# Patient Record
Sex: MALE | Race: WHITE | Employment: PART TIME | URBAN - METROPOLITAN AREA
[De-identification: names, ages, dates, MRNs, and addresses within clinical notes are randomized per-mention and may not be internally consistent; named-entity substitution may affect disease eponyms.]

---

## 2022-03-16 ENCOUNTER — APPOINTMENT (OUTPATIENT)
Dept: PHYSICAL THERAPY | Age: 27
End: 2022-03-16
Payer: MEDICAID

## 2022-03-21 ENCOUNTER — HOSPITAL ENCOUNTER (OUTPATIENT)
Dept: PHYSICAL THERAPY | Age: 27
Discharge: HOME | End: 2022-03-21
Payer: MEDICAID

## 2022-03-21 PROCEDURE — 97162 PT EVAL MOD COMPLEX 30 MIN: CPT | Performed by: PHYSICAL THERAPIST

## 2022-03-21 NOTE — PROGRESS NOTES
[facility]  [facility]  [address], [address], [address]  Phone: [phone]  Fax: [phone] / [address] [facility]  Patient Name: Terra Anne : 1995   Medical   Diagnosis: Pain in right ankle [M25.571] Treatment Diagnosis: R Ankle Pain   Onset Date: 8 years ago     Referral Source: Tasia Fitzgerald DPM Start of Care [facility] operated by [facility]): 3/21/2022   Prior Hospitalization: See medical history Provider #: [id number]   Prior Level of Function: Several h/o R ankle instability episodes due to prior injury    Comorbidities: HTN   Medications: Verified on Patient Summary List   The Plan of Care and following information is based on the information from the initial evaluation.   ===========================================================================================  Assessment / key information:  22 y/o male presents to PT with long h/o R ankle pain and instability episodes. Symptoms started when he had a snowboarding accident about 8 years ago with a \"growth plate injury. \"  He notes that pain and instability episodes have become more frequent over the years. His pain is rated at 5/10, and notes the ankle clunks often - and has resulted in him falling on multiple occassions. He received a \"[employer]race\" brace back in November and this has helped with instability episdoes. Examination reveals pt walks with R LE ER at hip. He has flatten arches bilaterally. R ankle is limited into DF by tightness of the gastroc and strength testing reveals weakness of R ankle tibialis posterior (3+/5). Inversion ROM is increased bilaterally. Single leg stance on the right is more difficult to control with a tendency to increase WB onto medial arch. Mr Carine Walker has signs and symptoms suggestive of chronic R ankle pain due to underlying functional instability. ===========================================================================================  Eval Complexity: History MEDIUM  Complexity : 1-2 comorbidities / personal factors will impact the outcome/ POC ;  Examination  MEDIUM Complexity : 3 Standardized tests and measures addressing body structure, function, activity limitation and / or participation in recreation ; Presentation MEDIUM Complexity : Evolving with changing characteristics ; Decision Making MEDIUM Complexity : FOTO score of 26-74; Overall Complexity MEDIUM  Problem List: pain affecting function, decrease ROM, decrease strength, impaired gait/ balance, decrease ADL/ functional abilitiies, decrease activity tolerance, decrease flexibility/ joint mobility and decrease transfer abilities   Treatment Plan may include any combination of the following: Therapeutic exercise, Therapeutic activities, Neuromuscular re-education, Physical agent/modality, Gait/balance training, Manual therapy, Dry Needling, Aquatic therapy, Patient education, Self Care training, Functional mobility training, Home safety training and Stair training  Patient / Family readiness to learn indicated by: asking questions, trying to perform skills and interest  Persons(s) to be included in education: patient (P)  Barriers to Learning/Limitations: None  Measures taken:    Patient Goal (s): \"less pain\"   Patient self reported health status: good  Rehabilitation Potential: good   Short Term Goals: To be accomplished in  2  weeks:  1. Pt independent with basic HEP for basic calf stretching and ankle strengthening. 2. Pt to manage symptoms to </= 2/10 with HEP, RICE principle, and activity modification.  Long Term Goals: To be accomplished in  6  weeks:  1. Pt to increase FOTO score by 20 point sot reveal fucntional improvements. 2. Pt independent with high level HEP for R ankle flexibility, strengthening/endurance and proprioception.   3. Pt able to walk with equal gait components bilaterally. 4. Pt able to work entire shift without pain>/= 2/10. Frequency / Duration:   Patient to be seen  2  times per week for 6  weeks:  Patient / Caregiver education and instruction: self care, activity modification, brace/ splint application and exercises    Therapist Signature: Terell Meadows PT Date: 2/48/8077   Certification Period: NA Time: 8:44 AM   ===========================================================================================  I certify that the above Physical Therapy Services are being furnished while the patient is under my care. I agree with the treatment plan and certify that this therapy is necessary. Physician Signature:        Date:       Time:  _     John Reyes DPM  Please sign and return to [facility] or you may fax the signed copy to [phone]. Thank you.

## 2022-03-21 NOTE — PROGRESS NOTES
PHYSICAL THERAPY - DAILY TREATMENT NOTE    Patient Name: Terra Anne        Date: 3/21/2022  : 1995   YES Patient  Verified  Visit #:     Insurance: Payor: Rama More / Plan: 1 [place] [id number] / Product Type: Managed Care Medicaid /      In time: 3:00 Out time: 3:45   Total Treatment Time: 45     BCBS/Medicare Time Tracking (below)   Total Timed Codes (min):  - 1:1 Treatment Time:  -     TREATMENT AREA =  Pain in right ankle [M25.571]    SUBJECTIVE  Pain Level (on 0 to 10 scale):  5  / 10   Medication Changes/New allergies or changes in medical history, any new surgeries or procedures?     NO    If yes, update Summary List   Subjective Functional Status/Changes:  [x]  No changes reported     See eval/POC           Modalities Rationale:     decrease edema, decrease inflammation and decrease pain to improve patient's ability to prevent soreness   min [] Estim, type/location:                                      []  att     []  unatt     []  w/US     []  w/ice    []  w/heat    min []  Mechanical Traction: type/lbs                   []  pro   []  sup   []  int   []  cont    []  before manual    []  after manual    min []  Ultrasound, settings/location:      min []  Iontophoresis w/ dexamethasone, location:                                               []  take home patch       []  in clinic   - min []  Ice     []  Heat    location/position:     min []  Vasopneumatic Device, press/temp:     min []  Other:    [x] Skin assessment post-treatment (if applicable):    [x]  intact    []  redness- no adverse reaction     []redness  adverse reaction:        5 min Therapeutic Exercise:  [x]  See flow sheet   Rationale:      increase ROM, increase strength, improve coordination and increase proprioception to improve the patients ability to increase activity tolerance         Billed With/As:   [] TE   [] TA   [] Neuro   [] Self Care Patient Education: [x] Review HEP [] Progressed/Changed HEP based on:   [] positioning   [] body mechanics   [] transfers   [] heat/ice application    [] other:      Other Objective/Functional Measures:    See Eval     Post Treatment Pain Level (on 0 to 10) scale:   5  / 10     ASSESSMENT  Assessment/Changes in Function:     Pt has signs and symptoms suggestive of R ankle OA with underlying instability episodes. []  See Progress Note/Recertification   Patient will continue to benefit from skilled PT services to modify and progress therapeutic interventions, address functional mobility deficits, address ROM deficits, address strength deficits, analyze and address soft tissue restrictions, analyze and cue movement patterns, analyze and modify body mechanics/ergonomics, and assess and modify postural abnormalities to attain remaining goals.    Progress toward goals / Updated goals:    Goals established today     PLAN  [x]  Upgrade activities as tolerated YES Continue plan of care   []  Discharge due to :    []  Other:      Therapist: Paulina Zepeda PT    Date: 3/21/2022 Time: 8:45 AM     Future Appointments   Date Time Provider Prashant Tejeda   3/21/2022  3:00 PM Fredy Sherwood PT [facility]

## 2022-03-22 ENCOUNTER — HOSPITAL ENCOUNTER (OUTPATIENT)
Dept: PHYSICAL THERAPY | Age: 27
Discharge: HOME | End: 2022-03-22
Payer: MEDICAID

## 2022-03-22 PROCEDURE — 97116 GAIT TRAINING THERAPY: CPT

## 2022-03-22 PROCEDURE — 97112 NEUROMUSCULAR REEDUCATION: CPT

## 2022-03-22 PROCEDURE — 97110 THERAPEUTIC EXERCISES: CPT

## 2022-03-22 NOTE — PROGRESS NOTES
PHYSICAL THERAPY - DAILY TREATMENT NOTE    Patient Name: Chito Blake        Date: 3/22/2022  : 1995   YES Patient  Verified  Visit #:   2   of   12  Insurance: Payor: Griseldaandrea Murphy / Plan: 1 [facility] / Product Type: Managed Care Medicaid /      In time: 6999 Out time: 140   Total Treatment Time: 55     BCBS/Medicare Time Tracking (below)   Total Timed Codes (min):  NA 1:1 Treatment Time:  NA     TREATMENT AREA =  Pain in right ankle [M25.571]    SUBJECTIVE  Pain Level (on 0 to 10 scale):  4-5  / 10   Medication Changes/New allergies or changes in medical history, any new surgeries or procedures? NO    If yes, update Summary List   Subjective Functional Status/Changes:  []  No changes reported     I am always in pain if I am on my feet. Modalities Rationale:     decrease edema, decrease inflammation and decrease pain to improve patient's ability to perform pain-free ADLs.     min [] Estim, type/location:                                      []  att     []  unatt     []  w/US     []  w/ice    []  w/heat    min []  Mechanical Traction: type/lbs                   []  pro   []  sup   []  int   []  cont    []  before manual    []  after manual    min []  Ultrasound, settings/location:      min []  Iontophoresis w/ dexamethasone, location:                                               []  take home patch       []  in clinic   10 min [x]  Ice     []  Heat    location/position: R ankle in longsitting    min []  Vasopneumatic Device, press/temp:    If using vaso (only need to measure limb vaso being performed on)      pre-treatment girth :       post-treatment girth :       measured at (landmark location) :      min []  Other:    [x] Skin assessment post-treatment (if applicable):    [x]  intact    [x]  redness- no adverse reaction                  []redness  adverse reaction:        15 min Therapeutic Exercise:  [x]  See flow sheet   Rationale: increase ROM, increase strength, improve coordination and increase proprioception to improve the patients ability to perform unlimited ADLs. 10 min Therapeutic Activity: [x]  See flow sheet   Rationale:    increase ROM, increase strength and improve coordination to improve the patients ability to improve gait abilities    20 min Neuromuscular Re-ed: [x]  See flow sheet   Rationale:    increase strength, improve coordination, improve balance and increase proprioception to improve the patients ability to improve stability with ambulation    Billed With/As:   [x] TE   [] TA   [] Neuro   [] Self Care Patient Education: [x] Review HEP    [] Progressed/Changed HEP based on:   [] positioning   [] body mechanics   [] transfers   [] heat/ice application    [] other:      Other Objective/Functional Measures:    Initiated TE per FS     Post Treatment Pain Level (on 0 to 10) scale:   4  / 10     ASSESSMENT  Assessment/Changes in Function:     Pt required max cues throughout session in order to ensure appropriate form and activate post tib. In standing pt either rolls to outer side of foot or allows for full collapse into pes planus; improved with cues for arch control and with UE support during HR but pt fatigued quickly. []  See Progress Note/Recertification   Patient will continue to benefit from skilled PT services to modify and progress therapeutic interventions, address functional mobility deficits, address ROM deficits, address strength deficits, analyze and address soft tissue restrictions, analyze and cue movement patterns, analyze and modify body mechanics/ergonomics and assess and modify postural abnormalities to attain remaining goals. Progress toward goals / Updated goals:    Progressing towards STG 1.       PLAN  [x]  Upgrade activities as tolerated YES Continue plan of care   []  Discharge due to :    []  Other:      Therapist: Anuja Palm DPT    Date: 3/22/2022 Time: 2:34 PM     Future Appointments   Date Time Provider Prashant Tejeda   3/28/2022 12:30 PM Karsten Alvarado, PT [facility]   3/30/2022 12:30 PM Karsten Alvarado, PT [facility]   4/4/2022 12:45 PM Gladies Goodpasture, PT [facility]   4/6/2022 12:45 PM Pietro Dooley, PT [facility]

## 2022-03-30 ENCOUNTER — HOSPITAL ENCOUNTER (OUTPATIENT)
Dept: PHYSICAL THERAPY | Age: 27
Discharge: HOME | End: 2022-03-30
Payer: MEDICAID

## 2022-03-30 PROCEDURE — 97112 NEUROMUSCULAR REEDUCATION: CPT

## 2022-03-30 PROCEDURE — 97110 THERAPEUTIC EXERCISES: CPT

## 2022-03-30 PROCEDURE — 97530 THERAPEUTIC ACTIVITIES: CPT

## 2022-03-30 NOTE — PROGRESS NOTES
PHYSICAL THERAPY - DAILY TREATMENT NOTE    Patient Name: Cecil Yates        Date: 3/30/2022  : 1995   YES Patient  Verified  Visit #:   3   of   12  Insurance: Payor: Reynaldo Harrell / Plan: 1 [facility] 270 / Product Type: Managed Care Medicaid /      In time:  Out time: 125   Total Treatment Time: 55     BCBS/Medicare Time Tracking (below)   Total Timed Codes (min):  NA 1:1 Treatment Time:  NA     TREATMENT AREA =  Pain in right ankle [M25.571]    SUBJECTIVE  Pain Level (on 0 to 10 scale):  4-5  / 10   Medication Changes/New allergies or changes in medical history, any new surgeries or procedures? NO    If yes, update Summary List   Subjective Functional Status/Changes:  []  No changes reported     I wear my brace all the time. Hoping to get stronger before I have my surgery. Modalities Rationale:     decrease edema, decrease inflammation and decrease pain to improve patient's ability to perform pain-free ADLs.     min [] Estim, type/location:                                      []  att     []  unatt     []  w/US     []  w/ice    []  w/heat    min []  Mechanical Traction: type/lbs                   []  pro   []  sup   []  int   []  cont    []  before manual    []  after manual    min []  Ultrasound, settings/location:      min []  Iontophoresis w/ dexamethasone, location:                                               []  take home patch       []  in clinic   10 min [x]  Ice     []  Heat    location/position: R ankle in longsitting    min []  Vasopneumatic Device, press/temp:    If using vaso (only need to measure limb vaso being performed on)      pre-treatment girth :       post-treatment girth :       measured at (landmark location) :      min []  Other:    [x] Skin assessment post-treatment (if applicable):    [x]  intact    [x]  redness- no adverse reaction                  []redness  adverse reaction:        15 min Therapeutic Exercise:  [x] See flow sheet   Rationale:      increase ROM, increase strength, improve coordination and increase proprioception to improve the patients ability to perform unlimited ADLs. 10 min Therapeutic Activity: [x]  See flow sheet   Rationale:    increase ROM, increase strength and improve coordination to improve the patients ability to improve gait abilities    20 min Neuromuscular Re-ed: [x]  See flow sheet   Rationale:    increase strength, improve coordination, improve balance and increase proprioception to improve the patients ability to improve stability with ambulation    Billed With/As:   [x] TE   [] TA   [] Neuro   [] Self Care Patient Education: [x] Review HEP    [] Progressed/Changed HEP based on:   [] positioning   [] body mechanics   [] transfers   [] heat/ice application    [] other:      Other Objective/Functional Measures: Added Tband EV and toe yoga     Post Treatment Pain Level (on 0 to 10) scale:   4  / 10     ASSESSMENT  Assessment/Changes in Function:     Pt shows significant fatigue during all standing exercises with collapse into pes planus that he was unable to correct. Improved tolerance to Tband and seated exercises. []  See Progress Note/Recertification   Patient will continue to benefit from skilled PT services to modify and progress therapeutic interventions, address functional mobility deficits, address ROM deficits, address strength deficits, analyze and address soft tissue restrictions, analyze and cue movement patterns, analyze and modify body mechanics/ergonomics and assess and modify postural abnormalities to attain remaining goals. Progress toward goals / Updated goals:    Progressing towards STG 1.       PLAN  [x]  Upgrade activities as tolerated YES Continue plan of care   []  Discharge due to :    []  Other:      Therapist: Jorge Montenegro DPT    Date: 3/30/2022 Time: 2:34 PM     Future Appointments   Date Time Provider Prashant Tejeda   4/4/2022 12:45 PM Miah Shirin Hollis, PT [facility]   4/6/2022 12:45 PM [facility]

## 2022-04-04 ENCOUNTER — APPOINTMENT (OUTPATIENT)
Dept: PHYSICAL THERAPY | Age: 27
End: 2022-04-04
Payer: MEDICAID

## 2022-04-06 ENCOUNTER — HOSPITAL ENCOUNTER (OUTPATIENT)
Dept: PHYSICAL THERAPY | Age: 27
Discharge: HOME | End: 2022-04-06
Payer: MEDICAID

## 2022-04-06 PROCEDURE — 97530 THERAPEUTIC ACTIVITIES: CPT | Performed by: PHYSICAL THERAPIST

## 2022-04-06 PROCEDURE — 97110 THERAPEUTIC EXERCISES: CPT | Performed by: PHYSICAL THERAPIST

## 2022-04-06 PROCEDURE — 97112 NEUROMUSCULAR REEDUCATION: CPT | Performed by: PHYSICAL THERAPIST

## 2022-04-06 NOTE — PROGRESS NOTES
[address] [facility] PHYSICAL THERAPY AT [address] [place] [address]  [address] [facility], [address] ,[address], [address]  Phone: [phone]  Fax: [phone] SUMMARY  Patient Name: Liz Siu : 1995   Treatment/Medical Diagnosis: Pain in right ankle [M25.571]   Referral Source: Chela Singh, DPM     Date of Initial Visit: 3/21/21 Attended Visits: 4 Missed Visits: 1     SUMMARY OF TREATMENT  R ankle flexibility, strengthening/endurance, proprioception exercises, and modalities for symptom relief. CURRENT STATUS  Pt was last seen on 21, and reported pain of 4/10. He noted mild soreness after his PT sessions, but has been able to do HEP daily. Pt is scheduled to have surgery on 4/15/21, and will keep up with basic HEP for flexibility and strengthening/endurance exercises. Will DC from PT with goals partially achieved. Goal/Measure of Progress Goal Met? 1.  Pt independent with basic HEP for basic calf stretching and ankle strengthening. Status at last Eval: - Current Status: met yes   2. Pt to manage symptoms to </= 2/10 with HEP, RICE principle, and activity modification. Status at last Eval: 5/10 Current Status: 4/10 no     RECOMMENDATIONS  Will DC per patient request as he is scheduled for surgery on 4/15/21. If you have any questions/comments please contact us directly at [phone]. Thank you for allowing us to assist in the care of your patient. Therapist Signature: Yudith Villarreal PT Date: 21     Time: 5:31 PM         NOTE TO PHYSICIAN:  Your patient's insurance requires this discharge note be signed and returned. PLEASE COMPLETE THE ORDERS BELOW AND RETURN TO:  [facility] PHYSICAL THERAPY    ___ I have read the above report and request that my patient be discharged from therapy.      Physician Signature:        Date:       Time:                                        Chela Every, DPM

## 2022-04-06 NOTE — PROGRESS NOTES
PHYSICAL THERAPY - DAILY TREATMENT NOTE    Patient Name: Ludwig Wilkerson        Date: 2022  : 1995   YES Patient  Verified  Visit #:     Insurance: Payor: Trell Hernandez / Plan: 1 [facility] 270 / Product Type: Managed Care Medicaid /      In time: 12:40 Out time: 1:35   Total Treatment Time: 55     BCBS/Medicare Time Tracking (below)   Total Timed Codes (min):  - 1:1 Treatment Time:  -     TREATMENT AREA =  Pain in right ankle [M25.571]    SUBJECTIVE  Pain Level (on 0 to 10 scale):  4  / 10   Medication Changes/New allergies or changes in medical history, any new surgeries or procedures? NO    If yes, update Summary List   Subjective Functional Status/Changes:  [x]  No changes reported     Pt reports having less pain because he has been off his foot today. He notes that he will be having surgery on R ankle 4/15/22.           Modalities Rationale:     decrease edema, decrease inflammation and decrease pain to improve patient's ability to prevent soreness   min [] Estim, type/location:                                      []  att     []  unatt     []  w/US     []  w/ice    []  w/heat    min []  Mechanical Traction: type/lbs                   []  pro   []  sup   []  int   []  cont    []  before manual    []  after manual    min []  Ultrasound, settings/location:      min []  Iontophoresis w/ dexamethasone, location:                                               []  take home patch       []  in clinic   10 min [x]  Ice     []  Heat    location/position: R Ankle - reclined after session    min []  Vasopneumatic Device, press/temp:     min []  Other:    [x] Skin assessment post-treatment (if applicable):    [x]  intact    []  redness- no adverse reaction     []redness  adverse reaction:        20 min Therapeutic Exercise:  [x]  See flow sheet   Rationale:      increase ROM, increase strength, improve coordination and increase proprioception to improve the patients ability to increase activity tolerance        10 min Therapeutic Activity: [x]? See flow sheet   Rationale:    increase ROM, increase strength and improve coordination to improve the patients ability to improve gait abilities     15 min Neuromuscular Re-ed: [x]? See flow sheet   Rationale:    increase strength, improve coordination, improve balance and increase proprioception to improve the patients ability to improve stability with ambulation      Billed With/As:   [] TE   [] TA   [] Neuro   [] Self Care Patient Education: [x] Review HEP    [] Progressed/Changed HEP based on:   [] positioning   [] body mechanics   [] transfers   [] heat/ice application    [] other:      Other Objective/Functional Measures:    Pt did well with SLS while wearing brace - able to get good muscle action wittout excessive ankle motion. Post Treatment Pain Level (on 0 to 10) scale:   4-5  / 10     ASSESSMENT  Assessment/Changes in Function:     Pt is independent with HEP for R ankle ROM/strengthening and will be discharged awaiting surgery 4/15/22. []  [facility] Note   Patient will continue to benefit from skilled PT services to modify and progress therapeutic interventions, address functional mobility deficits, address ROM deficits, address strength deficits, analyze and address soft tissue restrictions, analyze and cue movement patterns, analyze and modify body mechanics/ergonomics, and assess and modify postural abnormalities to attain remaining goals. Progress toward goals / Updated goals:    See DC Note     PLAN  []  Upgrade activities as tolerated    [x]  Discharge due to : DC - scheduled for surgery    []  Other:      Therapist: Cookie Lambert PT    Date: 4/6/2022 Time: 8:45 AM     No future appointments.

## 2022-05-05 ENCOUNTER — HOSPITAL ENCOUNTER (OUTPATIENT)
Dept: PHYSICAL THERAPY | Age: 27
Discharge: HOME | End: 2022-05-05
Payer: MEDICAID

## 2022-05-05 PROCEDURE — 97162 PT EVAL MOD COMPLEX 30 MIN: CPT

## 2022-05-05 NOTE — PROGRESS NOTES
PHYSICAL THERAPY - DAILY TREATMENT NOTE    Patient Name: Lainey Sandoval        Date: 2022  : 1995   YES Patient  Verified  Visit #:     Insurance: Payor: Gilberto Marchvern / Plan: 1 [facility] 270 / Product Type: Managed Care Medicaid /      In time: 145 Out time: 235   Total Treatment Time: 50     BCBS/Medicare Time Tracking (below)   Total Timed Codes (min):   1:1 Treatment Time:       TREATMENT AREA =  Right ankle pain [M25.571]    SUBJECTIVE  Pain Level (on 0 to 10 scale):  5  / 10   Medication Changes/New allergies or changes in medical history, any new surgeries or procedures? NO    If yes, update Summary List   Subjective Functional Status/Changes:  []  No changes reported   The patient reports he shattered the growth plate in his ankle when snowboarding when 16 y/o. He notes continued pain and swelling since. Currently he is NWB, in CAM boot, walking with B axillary crutches.         Modalities Rationale:     decrease inflammation, decrease pain and increase tissue extensibility to improve patient's ability to perform ADLs   min [] Estim, type/location:                                     []  att     []  unatt     []  w/US     []  w/ice    []  w/heat    min []  Mechanical Traction: type/lbs                   []  pro   []  sup   []  int   []  cont    []  before manual    []  after manual    min []  Ultrasound, settings/location:      min []  Iontophoresis w/ dexamethasone, location:                                               []  take home patch       []  in clinic    min []  Ice     []  Heat    location/position:     min []  Vasopneumatic Device, press/temp: If using vaso (only need to measure limb vaso being performed on)      pre-treatment girth :       post-treatment girth :       measured at (landmark location) :      min []  Other:    [x] Skin assessment post-treatment (if applicable):    [x]  intact    [x]  redness- no adverse reaction []redness  adverse reaction:        10/NB min Therapeutic Exercise:  [x]  See flow sheet   Rationale:      increase ROM and increase strength to improve the patients ability to perform unlimited ADLs        min Therapeutic Activity: [x]  See flow sheet   Rationale:    increase ROM, increase strength, and improve coordination to improve the patients ability to squat, walk and negotiate stairs     min Neuromuscular Re-ed: [x]  See flow sheet   Rationale:    improve coordination, improve balance, and increase proprioception to improve the patients stability     min Gait Training:  ___ feet with ___ device on level surfaces with ___ level of assistance   Rationale: To improve ambulation safety and efficiency in order to improve patient's ability to safely ambulate at home for self care. Billed With/As:   [] TE   [] TA   [] Neuro   [] Self Care Patient Education: [x] Review HEP    [] Progressed/Changed HEP based on:   [] positioning   [] body mechanics   [] transfers   [] heat/ice application    [x] other: Issued written HEP and reviewed     Other Objective/Functional Measures:    AROM: R ankle DF 0°, PF 44°, INV 20°, EV 12°  MMT: TBA  MLT: dec gastroc length    Palpation: swelling       Post Treatment Pain Level (on 0 to 10) scale:   5  / 10     ASSESSMENT  Assessment/Changes in Function:     See POC     []  See Progress Note/Recertification   Patient will continue to benefit from skilled PT services to modify and progress therapeutic interventions, address functional mobility deficits, address ROM deficits, address strength deficits, analyze and address soft tissue restrictions, analyze and cue movement patterns, analyze and modify body mechanics/ergonomics, assess and modify postural abnormalities, address imbalance/dizziness and instruct in home and community integration to attain remaining goals.    Progress toward goals / Updated goals:    Progressing towards newly established goals in [address] [facility] [address]  [x] Upgrade activities as tolerated YES Continue plan of care   []  Discharge due to :    []  Other:      Therapist: Elbert Puente, PT, DPT, MTC, CMTPT    Date: 5/5/2022 Time: 1:46 PM     No future appointments.

## 2022-05-05 NOTE — PROGRESS NOTES
[facility] PHYSICAL THERAPY AT [address] [place] [address]  [address], [address] ,[address], [address]  Phone: [phone]  Fax: [phone] / [address] [address] PHYSICAL THERAPY SERVICES  Patient Name: Ishmael Sandoval : 1995   Medical   Diagnosis: Right ankle pain [M25.571] Treatment Diagnosis: R ankle pain   Onset Date: DOS 4/15/22     Referral Source: Pingree Sender Start of Care [facility]): 2022   Prior Hospitalization: See medical history Provider #: [id number]   Prior Level of Function: Working at [employer] [place] 10-15 miles   Comorbidities: HX R ankle sprains   Medications: Verified on Patient Summary List   The Plan of Care and following information is based on the information from the initial evaluation.   ===========================================================================================  Assessment / key information: Patient is a 23 y.o. male who presents to [facility] R ankle arthroscopy with debridement, tibial and fibula exostectomy, ATFL reconstruction and STJ synovectomy on 4/15/22. The patient reports he shattered the growth plate in his ankle when snowboarding when 16 y/o. He notes continued pain and swelling since. Currently he is NWB, in CAM boot, walking with B axillary crutches. Objective PT examination findings include:  AROM: R ankle DF 0°, PF 44°, INV 20°, EV 12°  MMT: TBA  MLT: dec gastroc length    Palpation: swelling throughout ankle, impaired senstion    A home exercise program was demonstrated and provided to address the above objective and functional deficits.  Patient can benefit from skilled PT interventions to improve ROM/stability, decrease pain/swelling, to facilitate performance of ADLs & improve overall functional status.   ===========================================================================================  Eval Complexity: History MEDIUM  Complexity : 1-2 comorbidities / personal factors will impact the outcome/ POC ;  Examination  MEDIUM Complexity : 3 Standardized tests and measures addressing body structure, function, activity limitation and / or participation in recreation ; Presentation MEDIUM Complexity : Evolving with changing characteristics ; Decision Making HIGH Complexity : FOTO score of 1- 25 ; Overall Complexity MEDIUM  Problem List: pain affecting function, decrease ROM, decrease strength, edema affecting function, impaired gait/ balance, decrease ADL/ functional abilitiies, decrease activity tolerance, decrease flexibility/ joint mobility and decrease transfer abilities, FOTO score 17  Treatment Plan may include any combination of the following: Therapeutic exercise, Therapeutic activities, Neuromuscular re-education, Physical agent/modality, Gait/balance training, Manual therapy including dry needling, Aquatic therapy and Patient education  Patient / Family readiness to learn indicated by: asking questions, trying to perform skills and interest  Persons(s) to be included in education: patient (P)  Barriers to Learning/Limitations: no  Measures taken:    Patient Goal (s): \"Less Pain\"   Patient self reported health status: good  Rehabilitation Potential: good   Short Term Goals: To be accomplished in  1-2  weeks:  1) Patient to be independent and compliant with initial HEP to prevent further disability. 2) Restore R ankle AROM to WNL so ROM is available for normal walking mechanics. 3) Patient will report decreased c/o pain to < or = 2/10 to facilitate ease with WBing on R LE with manageable sx in R ankle in order to return to walking. 4)   Long Term Goals: To be accomplished in  3-4  weeks:  1) Patient to be independent & compliant with a progressive, high level HEP in order to maintain gains made in physical therapy. 2) Improve FOTO score to 51 indicating significant functional improvement in order to return to PLOF.   3) Patient to walk 500 ft with normal gait mechanics in order to return to limited community walking. 4) Maintain SLS for 30 sec indicating improved balance/stability needed for walking and unilateral WBing. Frequency / Duration:   Patient to be seen  2-3  times per week for 3-4  weeks:  Patient / Caregiver education and instruction: self care, activity modification and exercises    Therapist Signature: Brent Shankar PT, DPT, MTC, CMTPT Date: 5/4/7518   Certification Period: NA Time: 2:59 PM   ===========================================================================================  I certify that the above Physical Therapy Services are being furnished while the patient is under my care. I agree with the treatment plan and certify that this therapy is necessary. Physician Signature:        Date:       Time:              Yusuf Echols DPM  Please sign and return to [facility] or you may fax the signed copy to [phone]. Thank you.

## 2022-05-09 ENCOUNTER — HOSPITAL ENCOUNTER (OUTPATIENT)
Dept: PHYSICAL THERAPY | Age: 27
Discharge: HOME | End: 2022-05-09
Payer: MEDICAID

## 2022-05-09 PROCEDURE — 97112 NEUROMUSCULAR REEDUCATION: CPT

## 2022-05-09 PROCEDURE — 97110 THERAPEUTIC EXERCISES: CPT

## 2022-05-09 NOTE — PROGRESS NOTES
PHYSICAL THERAPY - DAILY TREATMENT NOTE    Patient Name: Teresa Licona        Date: 2022  : 1995   YES Patient  Verified  Visit #:   2   of   13  Insurance: Payor: 100 [place],9D / Plan: 1 [facility] 270 / Product Type: Managed Care Medicaid /      In time: 500 Out time: 540   Total Treatment Time: 40     BCBS/Medicare Time Tracking (below)   Total Timed Codes (min):  NA 1:1 Treatment Time:  NA     TREATMENT AREA =  Right ankle pain [M25.571]    SUBJECTIVE  Pain Level (on 0 to 10 scale):  6  / 10   Medication Changes/New allergies or changes in medical history, any new surgeries or procedures? NO    If yes, update Summary List   Subjective Functional Status/Changes:  []  No changes reported     I saw the doctor today and they took my stiches out. They gave me new guidelines with WB restricitons    Note included: NWB for 1 more week to transition into WB in boot for 2 weeks then transition to brace and supportive shoe. No resistance for 1 more week. Modalities Rationale:     decrease edema, decrease inflammation and decrease pain to improve patient's ability to perform pain-free ADLs.     min [] Estim, type/location:                                      []  att     []  unatt     []  w/US     []  w/ice    []  w/heat    min []  Mechanical Traction: type/lbs                   []  pro   []  sup   []  int   []  cont    []  before manual    []  after manual    min []  Ultrasound, settings/location:      min []  Iontophoresis w/ dexamethasone, location:                                               []  take home patch       []  in clinic   HEP min []  Ice     []  Heat    location/position:     min []  Vasopneumatic Device, press/temp:    If using vaso (only need to measure limb vaso being performed on)      pre-treatment girth :       post-treatment girth :       measured at (landmark location) :      min []  Other:    [] Skin assessment post-treatment (if applicable):    []  intact    []  redness- no adverse reaction                  []redness  adverse reaction:        25 min Therapeutic Exercise:  [x]  See flow sheet   Rationale:      increase ROM, increase strength, improve coordination and increase proprioception to improve the patients ability to perform unlimited ADLs. 15 min Neuromuscular Re-ed: [x]  See flow sheet   Rationale:    increase ROM, increase strength, improve coordination and increase proprioception to improve the patients ability to improve stability required for ambulation    Billed With/As:   [x] TE   [] TA   [] Neuro   [] Self Care Patient Education: [x] Review HEP    [] Progressed/Changed HEP based on:   [] positioning   [] body mechanics   [] transfers   [] heat/ice application    [] other:      Other Objective/Functional Measures:    Initiated TE per FS     Post Treatment Pain Level (on 0 to 10) scale:   4  / 10     ASSESSMENT  Assessment/Changes in Function:     Pt requires cues to allow for all AROM of ankle without hip ER/IR or knee flexion/ext. Mild ERP at end ranges of motion. Significant fatigue noted with 3-way SLR with visible shaking/juddering. []  See Progress Note/Recertification   Patient will continue to benefit from skilled PT services to modify and progress therapeutic interventions, address functional mobility deficits, address ROM deficits, address strength deficits, analyze and address soft tissue restrictions, analyze and cue movement patterns, analyze and modify body mechanics/ergonomics and assess and modify postural abnormalities to attain remaining goals. Progress toward goals / Updated goals:    Progressing towards STG 2.      PLAN  [x]  Upgrade activities as tolerated YES Continue plan of care   []  Discharge due to :    []  Other:      Therapist: Colletta Brightly, DPT    Date: 5/9/2022 Time: 5:06 PM     Future Appointments   Date Time Provider Prashant Tejeda   5/11/2022  4:30 PM Yogesh Mart PT [facility]   5/16/2022  5:00 PM Daune Duet, PT [facility]   5/18/2022  2:15 PM Mardelle Hastings, PT [facility]   5/23/2022  5:00 PM Daune Duet, PT [facility]   5/25/2022  2:15 PM Mardelle Hastings, PT [facility]   6/1/2022  2:15 PM Mardelle Hastings, PT [facility]   6/3/2022  2:00 PM Daune Duet, PT [facility]   6/6/2022  3:30 PM Daune Duet, PT [facility]   6/8/2022  3:45 PM Tara Dooley, PT [facility]

## 2022-05-16 ENCOUNTER — HOSPITAL ENCOUNTER (OUTPATIENT)
Dept: PHYSICAL THERAPY | Age: 27
Discharge: HOME | End: 2022-05-16
Payer: MEDICAID

## 2022-05-16 PROCEDURE — 97110 THERAPEUTIC EXERCISES: CPT

## 2022-05-16 PROCEDURE — 97116 GAIT TRAINING THERAPY: CPT

## 2022-05-16 PROCEDURE — 97112 NEUROMUSCULAR REEDUCATION: CPT

## 2022-05-16 NOTE — PROGRESS NOTES
PHYSICAL THERAPY - DAILY TREATMENT NOTE    Patient Name: Yancy Ortega        Date: 2022  : 1995   YES Patient  Verified  Visit #:   3   of   13  Insurance: Payor: 100 [place],9D / Plan: 1 [facility] 270 / Product Type: Managed Care Medicaid /      In time: 450 Out time: 530   Total Treatment Time: 40     BCBS/Medicare Time Tracking (below)   Total Timed Codes (min):  NA 1:1 Treatment Time:  NA     TREATMENT AREA =  Right ankle pain [M25.571]    SUBJECTIVE  Pain Level (on 0 to 10 scale):  5- 10   Medication Changes/New allergies or changes in medical history, any new surgeries or procedures? NO    If yes, update Summary List   Subjective Functional Status/Changes:  []  No changes reported     I have been weight bearing with a cane since Friday ( 4 days prior). I feel some pain with the active motions through the front side of my ankle. Modalities Rationale:     decrease edema, decrease inflammation and decrease pain to improve patient's ability to perform pain-free ADLs.     min [] Estim, type/location:                                      []  att     []  unatt     []  w/US     []  w/ice    []  w/heat    min []  Mechanical Traction: type/lbs                   []  pro   []  sup   []  int   []  cont    []  before manual    []  after manual    min []  Ultrasound, settings/location:      min []  Iontophoresis w/ dexamethasone, location:                                               []  take home patch       []  in clinic   HEP min []  Ice     []  Heat    location/position:     min []  Vasopneumatic Device, press/temp:    If using vaso (only need to measure limb vaso being performed on)      pre-treatment girth :       post-treatment girth :       measured at (landmark location) :      min []  Other:    [] Skin assessment post-treatment (if applicable):    []  intact    []  redness- no adverse reaction                  []redness  adverse reaction: 15 min Therapeutic Exercise:  [x]  See flow sheet   Rationale:      increase ROM, increase strength, improve coordination and increase proprioception to improve the patients ability to perform unlimited ADLs. 10 min Neuromuscular Re-ed: [x]  See flow sheet   Rationale:    increase ROM, increase strength, improve coordination and increase proprioception to improve the patients ability to improve stability required for ambulation      5/NB min Therapeutic Activity: [x]  See flow sheet   Rationale:    increase ROM, increase strength, improve coordination and increase proprioception to improve the patients ability to improve gait mecahnics    10 min Gait Trainin feet x2 with SPC device in L on level surfaces with emphasis on cane progression and achieving TKE during midstance   Rationale: To improve ambulation safety and efficiency in order to improve patient's ability to safely ambulate at home for self care. Billed With/As:   [x] TE   [] TA   [] Neuro   [] Self Care Patient Education: [x] Review HEP    [] Progressed/Changed HEP based on:   [] positioning   [] body mechanics   [] transfers   [] heat/ice application    [] other:      Other Objective/Functional Measures: Added YTB to ankle 4 way    Added RB seated     Post Treatment Pain Level (on 0 to 10) scale:   4  / 10     ASSESSMENT  Assessment/Changes in Function:     Pt showed improved gait mechanics with cane in LUE but stated he would not be using that arm despite PT recommendation because he prefers to lean to the R as it makes him feel more stable. He was educated to maintain UE support on opposite side to decrease amount of weight bearing required during midstance.       []  See Progress Note/Recertification   Patient will continue to benefit from skilled PT services to modify and progress therapeutic interventions, address functional mobility deficits, address ROM deficits, address strength deficits, analyze and address soft tissue restrictions, analyze and cue movement patterns, analyze and modify body mechanics/ergonomics and assess and modify postural abnormalities to attain remaining goals. Progress toward goals / Updated goals:    Progressing towards STG 2.      PLAN  [x]  Upgrade activities as tolerated YES Continue plan of care   []  Discharge due to :    []  Other:      Therapist: Svetlana Thomas DPT    Date: 5/16/2022 Time: 5:06 PM     Future Appointments   Date Time Provider Prashant Tejeda   5/18/2022  2:15 PM Kandis Cash, PT [facility]   5/23/2022  5:00 PM Tania Villaseñor, PT [facility]   5/25/2022  2:15 PM Kandis Cash, PT [facility]   6/1/2022  2:15 PM Kandsi Cash, PT [facility]   6/3/2022  2:00 PM Tania Villaseñor, PT [facility]   6/6/2022  3:30 PM Tania Villaseñor, PT [facility]   6/8/2022  3:45 PM Eduard Dooley, PT [facility]

## 2022-05-23 ENCOUNTER — HOSPITAL ENCOUNTER (OUTPATIENT)
Dept: PHYSICAL THERAPY | Age: 27
Discharge: HOME | End: 2022-05-23
Payer: MEDICAID

## 2022-05-23 PROCEDURE — 97112 NEUROMUSCULAR REEDUCATION: CPT

## 2022-05-23 PROCEDURE — 97530 THERAPEUTIC ACTIVITIES: CPT

## 2022-05-23 PROCEDURE — 97110 THERAPEUTIC EXERCISES: CPT

## 2022-05-23 NOTE — PROGRESS NOTES
PHYSICAL THERAPY - DAILY TREATMENT NOTE    Patient Name: Ismael Hernandez        Date: 2022  : 1995   YES Patient  Verified  Visit #:      12  Insurance: Payor: 100 [place],9D / Plan: 1 [facility] 270 / Product Type: Managed Care Medicaid /      In time: 500 Out time: 545   Total Treatment Time: 45     BCBS/Medicare Time Tracking (below)   Total Timed Codes (min):  NA 1:1 Treatment Time:  NA     TREATMENT AREA =  Right ankle pain [M25.571]    SUBJECTIVE  Pain Level (on 0 to 10 scale):  6  / 10   Medication Changes/New allergies or changes in medical history, any new surgeries or procedures? NO    If yes, update Summary List   Subjective Functional Status/Changes:  []  No changes reported     I have constant pain that I don't think will go away even with the surgery. I walk without my boot when I get up at night from the bathroom to the bedroom. Modalities Rationale:     decrease edema, decrease inflammation and decrease pain to improve patient's ability to perform pain-free ADLs.     min [] Estim, type/location:                                      []  att     []  unatt     []  w/US     []  w/ice    []  w/heat    min []  Mechanical Traction: type/lbs                   []  pro   []  sup   []  int   []  cont    []  before manual    []  after manual    min []  Ultrasound, settings/location:      min []  Iontophoresis w/ dexamethasone, location:                                               []  take home patch       []  in clinic   HEP min []  Ice     []  Heat    location/position:     min []  Vasopneumatic Device, press/temp:    If using vaso (only need to measure limb vaso being performed on)      pre-treatment girth :       post-treatment girth :       measured at (landmark location) :      min []  Other:    [] Skin assessment post-treatment (if applicable):    []  intact    []  redness- no adverse reaction                  []redness - adverse reaction:        15 min Therapeutic Exercise:  [x]  See flow sheet   Rationale:      increase ROM, increase strength, improve coordination and increase proprioception to improve the patients ability to perform unlimited ADLs. 10 min Neuromuscular Re-ed: [x]  See flow sheet   Rationale:    increase ROM, increase strength, improve coordination and increase proprioception to improve the patients ability to improve stability required for ambulation      10 min Therapeutic Activity: [x]  See flow sheet   Rationale:    increase ROM, increase strength, improve coordination and increase proprioception to improve the patients ability to improve gait [facility]    5/NB min Gait Trainin feet x2 with SPC device in L on level surfaces with emphasis on cane progression and achieving TKE during midstance   Rationale: To improve ambulation safety and efficiency in order to improve patient's ability to safely ambulate at home for self care. Billed With/As:   [x] TE   [] TA   [] Neuro   [] Self Care Patient Education: [x] Review HEP    [] Progressed/Changed HEP based on:   [] positioning   [] body mechanics   [] transfers   [] heat/ice application    [] other:      Other Objective/Functional Measures: Added TKE and weight shifting in // bars     Post Treatment Pain Level (on 0 to 10) scale:   4  / 10     ASSESSMENT  Assessment/Changes in Function:     Continues to show significant juddering/shaking with all quad based exercises. Good tolerance to weight shift but pt stated he had some anterior ankle pain with weight shift over 5 seconds.  Encouraged pt to wear boot for the final remaining week suggested from MD.      []  See Progress Note/Recertification   Patient will continue to benefit from skilled PT services to modify and progress therapeutic interventions, address functional mobility deficits, address ROM deficits, address strength deficits, analyze and address soft tissue restrictions, analyze and cue movement patterns, analyze and modify body mechanics/ergonomics and assess and modify postural abnormalities to attain remaining goals. Progress toward goals / Updated goals:    Progressing towards STG 2.      PLAN  [x]  Upgrade activities as tolerated YES Continue plan of care   []  Discharge due to :    []  Other:      Therapist: Gypsy Bence, DPT    Date: 5/23/2022 Time: 5:06 PM     Future Appointments   Date Time Provider Prashant Tejeda   5/25/2022  2:15 PM Yanira Chiang [facility]   6/1/2022  2:15 PM Radha Cuenca, PT [facility]   6/3/2022  2:00 PM Hailee Carroll, PT [facility]   6/6/2022  3:30 PM Hailee Carroll, PT [facility]   6/8/2022  3:45 PM Dio Dooley, PT [facility]

## 2022-05-25 ENCOUNTER — HOSPITAL ENCOUNTER (OUTPATIENT)
Dept: PHYSICAL THERAPY | Age: 27
Discharge: HOME | End: 2022-05-25
Payer: MEDICAID

## 2022-05-25 PROCEDURE — 97116 GAIT TRAINING THERAPY: CPT | Performed by: PHYSICAL THERAPIST

## 2022-05-25 PROCEDURE — 97110 THERAPEUTIC EXERCISES: CPT | Performed by: PHYSICAL THERAPIST

## 2022-05-25 NOTE — PROGRESS NOTES
PHYSICAL THERAPY - DAILY TREATMENT NOTE    Patient Name: Mohan Talley        Date: 2022  : 1995   YES Patient  Verified  Visit #:     Insurance: Payor: Mitchell Morales / Plan: 1 [facility] 270 / Product Type: Managed Care Medicaid /      In time: 2:15 Out time: 2\"55   Total Treatment Time: 40     BCBS/Medicare Time Tracking (below)   Total Timed Codes (min):  na 1:1 Treatment Time:  na     TREATMENT AREA =  Right ankle pain [M25.571]    SUBJECTIVE  Pain Level (on 0 to 10 scale):  5  / 10   Medication Changes/New allergies or changes in medical history, any new surgeries or procedures? NO    If yes, update Summary List   Subjective Functional Status/Changes:  []  No changes reported     Pt reports having soreness in the outside on his ankle where his sock pulled off a scab last night.              Modalities Rationale:     decrease edema, decrease inflammation and decrease pain to improve patient's ability to prevent soreness   min [] Estim, type/location:                                      []  att     []  unatt     []  w/US     []  w/ice    []  w/heat    min []  Mechanical Traction: type/lbs                   []  pro   []  sup   []  int   []  cont    []  before manual    []  after manual    min []  Ultrasound, settings/location:      min []  Iontophoresis w/ dexamethasone, location:                                               []  take home patch       []  in clinic   HEP min []  Ice     []  Heat    location/position:     min []  Vasopneumatic Device, press/temp:     min []  Other:    [] Skin assessment post-treatment (if applicable):    []  intact    []  redness- no adverse reaction     []redness - adverse reaction:        30 min Therapeutic Exercise:  [x]  See flow sheet   Rationale:      increase ROM, increase strength, improve coordination and increase proprioception to improve the patients ability to increase walking tolerance           10 min Gait Training:  _200__ feet with _SPC->no __ device on level surfaces with _S__ level of assistance   Rationale: To improve ambulation safety and efficiency in order to improve patient's ability to safely ambulate at home for self care. Billed With/As:   [] TE   [] TA   [] Neuro   [] Self Care Patient Education: [x] Review HEP    [] Progressed/Changed HEP based on:   [] positioning   [] body mechanics   [] transfers   [] heat/ice application    [] other:      Other Objective/Functional Measures: No wetness at incision, and good closure seen     Post Treatment Pain Level (on 0 to 10) scale:   4  / 10     ASSESSMENT  Assessment/Changes in Function:     Pt was cautioned not to move ankle to end range PF or inversion to protect the graft. He was able to walk, FWB R LE, in boot today. Pt was encouraged to touch R lateral foot ankle to avoid any hypersensitivity. He is concerned that he will not be able to afford post-op ankle brace and will bring the braces he has at home next session to see if any might be usable.     []  See Progress Note/Recertification   Patient will continue to benefit from skilled PT services to modify and progress therapeutic interventions, address functional mobility deficits, address ROM deficits, address strength deficits, analyze and address soft tissue restrictions, analyze and cue movement patterns, analyze and modify body mechanics/ergonomics, assess and modify postural abnormalities and address imbalance/dizziness to attain remaining goals.    Progress toward goals / Updated goals:    Pt showing improved WB through R LE and swelling is being managed well     PLAN  [x]  Upgrade activities as tolerated YES Continue plan of care   []  Discharge due to :    []  Other:      Therapist: Jolie Sandra, PT    Date: 5/25/2022 Time: 8:12 AM     Future Appointments   Date Time Provider Prashant Tejeda   5/25/2022  2:15 PM Josesito Narvaez, PT [facility]   6/1/2022  2:15 PM Susana Coburn Darlene Steve [facility]   6/3/2022  2:00 PM Sybil Paredes, PT [facility]   6/6/2022  3:30 PM Sybil Paredes, PT [facility]   6/8/2022  3:45 PM Kumar Dooley, PT [facility]

## 2022-06-03 ENCOUNTER — TELEPHONE (OUTPATIENT)
Dept: PHYSICAL THERAPY | Age: 27
End: 2022-06-03

## 2022-06-03 ENCOUNTER — HOSPITAL ENCOUNTER (OUTPATIENT)
Dept: PHYSICAL THERAPY | Age: 27
End: 2022-06-03

## 2022-06-06 ENCOUNTER — APPOINTMENT (OUTPATIENT)
Dept: PHYSICAL THERAPY | Age: 27
End: 2022-06-06

## 2022-06-08 ENCOUNTER — APPOINTMENT (OUTPATIENT)
Dept: PHYSICAL THERAPY | Age: 27
End: 2022-06-08

## 2022-07-06 NOTE — PROGRESS NOTES
[address] [place] [address] PHYSICAL THERAPY AT [address] [place] [address]  [address], [address] [place] [address] [address]  Phone: [phone]  Fax: [phone] SUMMARY  Patient Name: Lacy Gipson : 1995   Treatment/Medical Diagnosis: Right ankle pain [M25.571]   Referral Source: [facility]     Date of Initial Visit: 2022 Attended Visits: 5 Missed Visits: 5     SUMMARY OF TREATMENT  Gait training, balance training, strengthening of R ankle, foot, knee and hip, patient education, CP for inflammation and pain-control. CURRENT STATUS  Mr. Byron Mcclain was seen for 4 follow up visits following R ankle arthroscopy with debridement, tibial and fibula exostectomy, ATFL reconstruction and STJ synovectomy on 4/15/2022. On final visit on 2022, pt was ambulating in CAM boot with SPC alternating in RUE/LUE due to complaints of wrist pain. Pt was educated on transition from boot to use of brace but was concerned of cost of such brace. Pt was reporting 5/10 pain that day due to his sock pulling off one of his scabs the previous day. Pt did not return to 2 follow up visits following this appointment and called on 6/3/2022 reporting some warmth/redness along incision. He was encouraged to follow up with MD to rule out infection. He has not returned since this encounter nor verbalized need for further therapy, therefore current status is unknown. RECOMMENDATIONS  Discontinue therapy due to lack of attendance or compliance. If you have any questions/comments please contact us directly at [phone]. Thank you for allowing us to assist in the care of your patient. Therapist Signature: Bart Gan PT Date: 2022     Time: 58:20 AM     I certify that the above Physical Therapy Services are being furnished while the patient is under my care. I agree with the treatment plan and certify that this therapy is necessary.     Physician Signature: Date:                                     Time:                                                                       Lorena Thompson